# Patient Record
Sex: FEMALE | Employment: FULL TIME | URBAN - METROPOLITAN AREA
[De-identification: names, ages, dates, MRNs, and addresses within clinical notes are randomized per-mention and may not be internally consistent; named-entity substitution may affect disease eponyms.]

---

## 2022-10-31 ENCOUNTER — OFFICE VISIT (OUTPATIENT)
Dept: URGENT CARE | Facility: CLINIC | Age: 67
End: 2022-10-31
Payer: COMMERCIAL

## 2022-10-31 VITALS
HEART RATE: 84 BPM | WEIGHT: 140 LBS | OXYGEN SATURATION: 96 % | SYSTOLIC BLOOD PRESSURE: 113 MMHG | HEIGHT: 64 IN | DIASTOLIC BLOOD PRESSURE: 63 MMHG | RESPIRATION RATE: 18 BRPM | BODY MASS INDEX: 23.9 KG/M2 | TEMPERATURE: 98 F

## 2022-10-31 DIAGNOSIS — S80.02XA CONTUSION OF LEFT KNEE, INITIAL ENCOUNTER: ICD-10-CM

## 2022-10-31 DIAGNOSIS — Z02.6 ENCOUNTER RELATED TO WORKER'S COMPENSATION CLAIM: Primary | ICD-10-CM

## 2022-10-31 DIAGNOSIS — S89.92XA INJURY OF LEFT KNEE, INITIAL ENCOUNTER: ICD-10-CM

## 2022-10-31 PROCEDURE — 73562 XR KNEE 3 VIEW LEFT: ICD-10-PCS | Mod: FY,LT,S$GLB, | Performed by: RADIOLOGY

## 2022-10-31 PROCEDURE — 73562 X-RAY EXAM OF KNEE 3: CPT | Mod: FY,LT,S$GLB, | Performed by: RADIOLOGY

## 2022-10-31 PROCEDURE — 99203 PR OFFICE/OUTPT VISIT, NEW, LEVL III, 30-44 MIN: ICD-10-PCS | Mod: S$GLB,,, | Performed by: FAMILY MEDICINE

## 2022-10-31 PROCEDURE — 99203 OFFICE O/P NEW LOW 30 MIN: CPT | Mod: S$GLB,,, | Performed by: FAMILY MEDICINE

## 2022-10-31 NOTE — PROGRESS NOTES
"Subjective:       Patient ID: Lesley Jones is a 67 y.o. female.    Vitals:  height is 5' 4" (1.626 m) and weight is 63.5 kg (140 lb). Her temperature is 98.4 °F (36.9 °C). Her blood pressure is 113/63 and her pulse is 84. Her respiration is 18 and oxygen saturation is 96%.     Chief Complaint: Knee Injury    Pt was at work and tripped over extension cord which cause her to fall onto the left knee. Now pain, stiffness, swelling, weakness of left knee. Took tylenol with no relief.      Knee Injury  This is a new problem. The current episode started today. The problem occurs constantly. The problem has been unchanged. Associated symptoms include joint swelling and weakness. Pertinent negatives include no abdominal pain, anorexia, arthralgias, change in bowel habit, chest pain, chills, congestion, coughing, diaphoresis, fatigue, fever, headaches, myalgias, nausea, neck pain, numbness, rash, sore throat, swollen glands, urinary symptoms, vertigo, visual change or vomiting.     Constitution: Negative for chills, sweating, fatigue and fever.   HENT:  Negative for congestion and sore throat.    Neck: Negative for neck pain.   Cardiovascular:  Negative for chest pain.   Respiratory:  Negative for cough.    Gastrointestinal:  Negative for abdominal pain, nausea and vomiting.   Musculoskeletal:  Positive for joint swelling. Negative for joint pain and muscle ache.   Skin:  Negative for rash.   Neurological:  Negative for history of vertigo, headaches and numbness.     Objective:      Vitals:    10/31/22 1754   BP: 113/63   Pulse: 84   Resp: 18   Temp: 98.4 °F (36.9 °C)   SpO2: 96%   Weight: 63.5 kg (140 lb)   Height: 5' 4" (1.626 m)      Physical Exam   Constitutional: She is oriented to person, place, and time.  Non-toxic appearance. She does not appear ill. No distress.   Eyes: Conjunctivae are normal.   Cardiovascular: Normal rate, regular rhythm, normal heart sounds and normal pulses.   Pulmonary/Chest: Effort normal " and breath sounds normal.   Musculoskeletal:         General: Tenderness (left anterior knee tenderness without instability) present. No deformity.   Neurological: She is alert and oriented to person, place, and time.   Skin: Skin is not diaphoretic.         Comments: No open wounds   Psychiatric: Mood, judgment and thought content normal.       XR KNEE 3 VIEW LEFT    Result Date: 10/31/2022  EXAMINATION: XR KNEE 3 VIEW LEFT CLINICAL HISTORY: Pain, unspecified TECHNIQUE: AP, lateral, and Merchant views of the left knee were performed. COMPARISON: None FINDINGS: The bone mineralization is within normal limits.  There is no cortical step-off.  There is no evidence of periostitis. The joint spaces are maintained.  The soft tissues are unremarkable.  No radiopaque foreign body is identified. There is no evidence of a fracture or dislocation of the left knee.     No acute process. Electronically signed by: Devon Soliman MD Date:    10/31/2022 Time:    18:21    Assessment:       1. Encounter related to worker's compensation claim    2. Injury of left knee, initial encounter    3. Contusion of left knee, initial encounter          Plan:         Encounter related to worker's compensation claim    2. Injury of left knee, initial encounter  -     XR KNEE 3 VIEW LEFT; Future; Expected date: 10/31/2022  -     Ambulatory referral/consult to Occupational Medicine    3. Contusion of left knee, initial encounter    Patient Instructions   You have been seen for a work-related injury/ailment. You are released to go home and you need to follow up with [facility] for Work Restriction on the next business day.  Please call [phone] for help setting up the appointment.     Rest  Ice  Elevate  Compress  You may take over the counter tylenol/ ibuprofen per 's directions

## 2022-10-31 NOTE — PATIENT INSTRUCTIONS
You have been seen for a work-related injury/ailment. You are released to go home and you need to follow up with [facility] for Work Restriction on the next business day.  Please call [phone] for help setting up the appointment.     Rest  Ice  Elevate  Compress  You may take over the counter tylenol/ ibuprofen per 's directions

## 2022-11-02 ENCOUNTER — TELEPHONE (OUTPATIENT)
Dept: URGENT CARE | Facility: CLINIC | Age: 67
End: 2022-11-02
Payer: COMMERCIAL